# Patient Record
Sex: FEMALE | Race: WHITE
[De-identification: names, ages, dates, MRNs, and addresses within clinical notes are randomized per-mention and may not be internally consistent; named-entity substitution may affect disease eponyms.]

---

## 2019-04-08 ENCOUNTER — HOSPITAL ENCOUNTER (EMERGENCY)
Dept: HOSPITAL 91 - FTE | Age: 29
Discharge: HOME | End: 2019-04-08
Payer: COMMERCIAL

## 2019-04-08 ENCOUNTER — HOSPITAL ENCOUNTER (EMERGENCY)
Dept: HOSPITAL 10 - FTE | Age: 29
Discharge: HOME | End: 2019-04-08
Payer: COMMERCIAL

## 2019-04-08 VITALS — DIASTOLIC BLOOD PRESSURE: 76 MMHG | SYSTOLIC BLOOD PRESSURE: 124 MMHG

## 2019-04-08 VITALS — RESPIRATION RATE: 19 BRPM | HEART RATE: 98 BPM

## 2019-04-08 VITALS — HEIGHT: 55 IN | WEIGHT: 179.9 LBS | BODY MASS INDEX: 41.63 KG/M2

## 2019-04-08 DIAGNOSIS — R05: Primary | ICD-10-CM

## 2019-04-08 DIAGNOSIS — Z87.891: ICD-10-CM

## 2019-04-08 PROCEDURE — 94664 DEMO&/EVAL PT USE INHALER: CPT

## 2019-04-08 PROCEDURE — 99283 EMERGENCY DEPT VISIT LOW MDM: CPT

## 2019-04-08 RX ADMIN — IPRATROPIUM BROMIDE 1 MG: 0.5 SOLUTION RESPIRATORY (INHALATION) at 14:52

## 2019-04-08 RX ADMIN — ALBUTEROL SULFATE 1 MG: 2.5 SOLUTION RESPIRATORY (INHALATION) at 14:52

## 2019-04-08 NOTE — ERD
ER Documentation


Chief Complaint


Chief Complaint





COUGH, CONGESTION X3 DAYS





ROS


All systems reviewed and are negative except as per history of present illness.





Medications


Home Meds


Active Scripts


D-Methorphan Hb/P-Epd HCl/Bpm (Fwwzyrxzwi-Uwdhcaludjq-Xg Syr) 118 Ml Syrup, 5 ML


PO Q4H PRN for SHORTNESS OF BREATH for 10 Days, #1 BOTTLE


   Prov:GEORGIA LICEA DO         4/8/19


Prednisone* (Prednisone*) 20 Mg Tab, 40 MG PO DAILY for cough for 4 Days, TAB


   Prov:GEORGIA LICEA DO         4/8/19


Albuterol Sulfate* (Ventolin HFA*) 18 Gm Hfa.aer.ad, 2 PUFF INHALATION Q4H PRN 


for COUGH, #1 INHALER


   Prov:GEORGIA LICEA DO         4/8/19





Allergies


Allergies:  


Coded Allergies:  


     No Known Allergies (Verified  Allergy, Mild, 2/6/10)





PMhx/Soc


History of Surgery:  Yes (CSECTION)


Hx Neurological Disorder:  No


Hx Respiratory Disorders:  Yes (bronchitis)


Hx Cardiac Disorders:  No


Hx Miscellaneous Medical Probl:  No (NO OTHER MEDICAL PROBLEMS)


Hx Alcohol Use:  No


Hx Substance Use:  No


Hx Tobacco Use:  Yes


Smoking Status:  Never smoker





Physical Exam


Vitals





Vital Signs


  Date      Temp  Pulse  Resp  B/P (MAP)   Pulse Ox  O2          O2 Flow    FiO2


Time                                                 Delivery    Rate


    4/8/19           98    19                    98  Room Air


     15:14


    4/8/19           89    18                    97                           21


     14:53


    4/8/19  97.9     79    19      124/76        98


     13:39                           (92)





Physical Exam


Const:   No acute distress


Head:   Atraumatic 


Eyes:    Normal Conjunctiva


ENT:    Normal External Ears, Nose and Mouth.


Neck:               Full range of motion. No meningismus.


Resp:   Clear to auscultation bilaterally


Cardio:   Regular rate and rhythm, no murmurs


Abd:    Soft, non tender, non distended. Normal bowel sounds


Skin:   No petechiae or rashes


Back:   No midline or flank tenderness


Ext:    No cyanosis, or edema


Neur:   Awake and alert


Psych:    Normal Mood and Affect


Results 24 hrs





Current Medications


 Medications
   Dose
          Sig/Jake
       Start Time
   Status  Last


 (Trade)       Ordered        Route
 PRN     Stop Time              Admin
Dose


                              Reason                                Admin


 Albuterol
     5 mg           ONCE  STAT
    4/8/19        DC            4/8/19


(Proventil
                   NEB
           14:31
 4/8/19                14:52



0.083% (Neb))                                14:33


 Ipratropium
   0.5 mg         ONCE  STAT
    4/8/19        DC            4/8/19


Bromide
                      NEB
           14:31
 4/8/19                14:52



(Atrovent                                    14:33


0.02%



(Neb))


 Prednisone
    60 mg          ONCE  STAT
    4/8/19        DC            4/8/19


(Prednisone)                  PO
            14:31
 4/8/19                14:37



                                             14:33








Departure


Diagnosis:  


   Primary Impression:  


   Cough


Condition:  Fair


Patient Instructions:  Cough, Chronic, Uncertain Cause, (Adult)


Referrals:  


[facility]


YOU HAVE RECEIVED A MEDICAL SCREENING EXAM AND THE RESULTS INDICATE THAT YOU DO 


NOT HAVE A CONDITION THAT REQUIRES URGENT TREATMENT IN THE EMERGENCY DEPARTMENT.





FURTHER EVALUATION AND TREATMENT OF YOUR CONDITION CAN WAIT UNTIL YOU ARE SEEN 


IN YOUR DOCTORS OFFICE WITHIN THE NEXT 1-2 DAYS. IT IS YOUR RESPONSIBILITY TO 


MAKE AN APPOINTMENT FOR FOLOW-UP CARE.





IF YOU HAVE A PRIMARY DOCTOR


--you should call your primary doctor and schedule an appointment





IF YOU DO NOT HAVE A PRIMARY DOCTOR YOU CAN CALL OUR PHYSICIAN REFERRAL HOTLINE 


AT


 [phone] 





IF YOU CAN NOT AFFORD TO SEE A PHYSICIAN YOU CAN CHOSE FROM THE FOLLOWING 


[facility]





[facility] [phone] [address]. [facility] [phone] [address] [facility] [address] [facility]. [facility] [phone]


[address] [facility] [phone] [address]. [facility] [phone] [address] [facility]. [facility]. [phone]


[address]





Additional Instructions:  


Call your primary care doctor TOMORROW for an appointment during the next 1-2 


days.See the doctor sooner or return here if your condition worsens before your 


appointment time.











GEORGIA LICEA DO                  Apr 8, 2019 15:26